# Patient Record
Sex: MALE | Race: OTHER
[De-identification: names, ages, dates, MRNs, and addresses within clinical notes are randomized per-mention and may not be internally consistent; named-entity substitution may affect disease eponyms.]

---

## 2018-10-19 ENCOUNTER — HOSPITAL ENCOUNTER (EMERGENCY)
Dept: HOSPITAL 72 - EMR | Age: 53
LOS: 1 days | Discharge: HOME | End: 2018-10-20
Payer: MEDICAID

## 2018-10-19 VITALS — BODY MASS INDEX: 23.32 KG/M2 | WEIGHT: 140 LBS | HEIGHT: 65 IN

## 2018-10-19 VITALS — DIASTOLIC BLOOD PRESSURE: 70 MMHG | SYSTOLIC BLOOD PRESSURE: 130 MMHG

## 2018-10-19 VITALS — DIASTOLIC BLOOD PRESSURE: 78 MMHG | SYSTOLIC BLOOD PRESSURE: 138 MMHG

## 2018-10-19 DIAGNOSIS — K42.9: ICD-10-CM

## 2018-10-19 DIAGNOSIS — N39.0: ICD-10-CM

## 2018-10-19 DIAGNOSIS — K40.30: Primary | ICD-10-CM

## 2018-10-19 DIAGNOSIS — K57.30: ICD-10-CM

## 2018-10-19 LAB
ADD MANUAL DIFF: NO
ALBUMIN SERPL-MCNC: 4.2 G/DL (ref 3.4–5)
ALBUMIN/GLOB SERPL: 1 {RATIO} (ref 1–2.7)
ALP SERPL-CCNC: 69 U/L (ref 46–116)
ALT SERPL-CCNC: 34 U/L (ref 12–78)
ANION GAP SERPL CALC-SCNC: 12 MMOL/L (ref 5–15)
APPEARANCE UR: CLEAR
APTT BLD: 23 SEC (ref 23–33)
APTT PPP: YELLOW S
AST SERPL-CCNC: 19 U/L (ref 15–37)
BASOPHILS NFR BLD AUTO: 1.6 % (ref 0–2)
BILIRUB SERPL-MCNC: 0.5 MG/DL (ref 0.2–1)
BUN SERPL-MCNC: 15 MG/DL (ref 7–18)
CALCIUM SERPL-MCNC: 9.7 MG/DL (ref 8.5–10.1)
CHLORIDE SERPL-SCNC: 102 MMOL/L (ref 98–107)
CO2 SERPL-SCNC: 26 MMOL/L (ref 21–32)
CREAT SERPL-MCNC: 0.9 MG/DL (ref 0.55–1.3)
EOSINOPHIL NFR BLD AUTO: 1.2 % (ref 0–3)
ERYTHROCYTE [DISTWIDTH] IN BLOOD BY AUTOMATED COUNT: 11.3 % (ref 11.6–14.8)
GLOBULIN SER-MCNC: 4.2 G/DL
GLUCOSE UR STRIP-MCNC: NEGATIVE MG/DL
HCT VFR BLD CALC: 48.1 % (ref 42–52)
HGB BLD-MCNC: 16.6 G/DL (ref 14.2–18)
INR PPP: 1 (ref 0.9–1.1)
KETONES UR QL STRIP: (no result)
LEUKOCYTE ESTERASE UR QL STRIP: (no result)
LYMPHOCYTES NFR BLD AUTO: 14.7 % (ref 20–45)
MCV RBC AUTO: 86 FL (ref 80–99)
MONOCYTES NFR BLD AUTO: 6.4 % (ref 1–10)
NEUTROPHILS NFR BLD AUTO: 76.1 % (ref 45–75)
NITRITE UR QL STRIP: NEGATIVE
PH UR STRIP: 8 [PH] (ref 4.5–8)
PLATELET # BLD: 222 K/UL (ref 150–450)
POTASSIUM SERPL-SCNC: 4.2 MMOL/L (ref 3.5–5.1)
PROT UR QL STRIP: (no result)
RBC # BLD AUTO: 5.61 M/UL (ref 4.7–6.1)
SODIUM SERPL-SCNC: 140 MMOL/L (ref 136–145)
SP GR UR STRIP: 1.01 (ref 1–1.03)
UROBILINOGEN UR-MCNC: NORMAL MG/DL (ref 0–1)
WBC # BLD AUTO: 10.1 K/UL (ref 4.8–10.8)

## 2018-10-19 PROCEDURE — 84484 ASSAY OF TROPONIN QUANT: CPT

## 2018-10-19 PROCEDURE — 74177 CT ABD & PELVIS W/CONTRAST: CPT

## 2018-10-19 PROCEDURE — 36415 COLL VENOUS BLD VENIPUNCTURE: CPT

## 2018-10-19 PROCEDURE — 85730 THROMBOPLASTIN TIME PARTIAL: CPT

## 2018-10-19 PROCEDURE — 86850 RBC ANTIBODY SCREEN: CPT

## 2018-10-19 PROCEDURE — 96365 THER/PROPH/DIAG IV INF INIT: CPT

## 2018-10-19 PROCEDURE — 85610 PROTHROMBIN TIME: CPT

## 2018-10-19 PROCEDURE — 96376 TX/PRO/DX INJ SAME DRUG ADON: CPT

## 2018-10-19 PROCEDURE — 99285 EMERGENCY DEPT VISIT HI MDM: CPT

## 2018-10-19 PROCEDURE — 85025 COMPLETE CBC W/AUTO DIFF WBC: CPT

## 2018-10-19 PROCEDURE — 93005 ELECTROCARDIOGRAM TRACING: CPT

## 2018-10-19 PROCEDURE — 86901 BLOOD TYPING SEROLOGIC RH(D): CPT

## 2018-10-19 PROCEDURE — 83690 ASSAY OF LIPASE: CPT

## 2018-10-19 PROCEDURE — 81003 URINALYSIS AUTO W/O SCOPE: CPT

## 2018-10-19 PROCEDURE — 80053 COMPREHEN METABOLIC PANEL: CPT

## 2018-10-19 PROCEDURE — 86900 BLOOD TYPING SEROLOGIC ABO: CPT

## 2018-10-19 PROCEDURE — 96375 TX/PRO/DX INJ NEW DRUG ADDON: CPT

## 2018-10-19 PROCEDURE — 96361 HYDRATE IV INFUSION ADD-ON: CPT

## 2018-10-19 NOTE — EMERGENCY ROOM REPORT
History of Present Illness


General


Chief Complaint:  Abdominal Pain


Source:  Patient





Present Illness


HPI


Patient presents with severe abdominal pain.  He states it happened suddenly 

earlier today.  He feels it in his left flank radiating down to his groin.  

Also he feels it's epigastric.  He denies ever having pain like this before.  

Severe and he felt nauseated with some vomiting.  Denies any fevers or chills.  

There's no dysuria.  The patient's moving his bowels without any difficulty.  

The pain is 10/10 at this time.





He has had a hernia on the right.  It usually is able to be reduced without 

difficulty.





No URI sy, chest pain, headache, rashes, dysuria, extremity pain.  





Pain causing anxiety.


Allergies:  


Coded Allergies:  


     No Known Allergies (Unverified , 10/19/18)





Patient History


Past Medical History:  see triage record


Social History:  Denies: smoking


Social History Narrative


with friend


Reviewed Nursing Documentation:  PMH: Agreed; PSxH: Agreed





Nursing Documentation-PMH


Past Medical History:  No Stated History





Review of Systems


All Other Systems:  negative except mentioned in HPI





Physical Exam





Vital Signs








  Date Time  Temp Pulse Resp B/P (MAP) Pulse Ox O2 Delivery O2 Flow Rate FiO2


 


10/19/18 20:20 98.2 85 18 119/75 98 Room Air  





 98.2       








Sp02 EP Interpretation:  reviewed, normal


General Appearance:  GCS 15, non-toxic, moderate distress


Head:  normocephalic


Eyes:  bilateral eye normal inspection, bilateral eye PERRL


ENT:  moist mucus membranes


Neck:  supple


Respiratory:  lungs clear, normal breath sounds


Cardiovascular #1:  regular rate, rhythm


Cardiovascular #2:  2+ radial (R)


Gastrointestinal:  no rebound, guarding, tenderness, hernia - bilat, R = large, 

decreased bowel sounds


Genitourinary:  no CVA tenderness


Musculoskeletal:  back normal, gait/station normal, normal range of motion


Neurologic:  oriented x3, grossly normal


Psychiatric:  anxious - with pain


Skin:  normal inspection, warm/dry





Medical Decision Making


Diagnostic Impression:  


 Primary Impression:  


 Right inguinal hernia


 Additional Impression:  


 UTI (urinary tract infection)


 Qualified Codes:  N30.00 - Acute cystitis without hematuria


ER Course


Patient presents with severe abdominal pain.  Differential includes perforated 

peptic ulcer, cholecystitis, renal stone, aortic aneurysm, diverticulitis 

amongst others.  The patient needs to be evaluated with labs and a CT of the 

abdomen.  We will focus on aggressive pain control also.





Patient better with dilaudid.





WBC slightly high.  Min pyuria.





CT with incarcerated hernia.





Attempt to reduce.  Unable.





Antibiotics ordered.





Admit med.  





With pain meds hernia reduced.





Examined by Dr. Díaz who agrees that patient stable for outpatient 

observation and treatment.





Laboratory Tests








Test


  10/19/18


20:53 10/19/18


21:15


 


White Blood Count


  10.1 K/UL


(4.8-10.8) 


 


 


Red Blood Count


  5.61 M/UL


(4.70-6.10) 


 


 


Hemoglobin


  16.6 G/DL


(14.2-18.0) 


 


 


Hematocrit


  48.1 %


(42.0-52.0) 


 


 


Mean Corpuscular Volume 86 FL (80-99)   


 


Mean Corpuscular Hemoglobin


  29.6 PG


(27.0-31.0) 


 


 


Mean Corpuscular Hemoglobin


Concent 34.5 G/DL


(32.0-36.0) 


 


 


Red Cell Distribution Width


  11.3 %


(11.6-14.8)  L 


 


 


Platelet Count


  222 K/UL


(150-450) 


 


 


Mean Platelet Volume


  6.7 FL


(6.5-10.1) 


 


 


Neutrophils (%) (Auto)


  76.1 %


(45.0-75.0)  H 


 


 


Lymphocytes (%) (Auto)


  14.7 %


(20.0-45.0)  L 


 


 


Monocytes (%) (Auto)


  6.4 %


(1.0-10.0) 


 


 


Eosinophils (%) (Auto)


  1.2 %


(0.0-3.0) 


 


 


Basophils (%) (Auto)


  1.6 %


(0.0-2.0) 


 


 


Prothrombin Time


  10.8 SEC


(9.30-11.50) 


 


 


Prothrombin Time INR 1.0 (0.9-1.1)   


 


PTT


  23 SEC (23-33)


  


 


 


Sodium Level


  140 MMOL/L


(136-145) 


 


 


Potassium Level


  4.2 MMOL/L


(3.5-5.1) 


 


 


Chloride Level


  102 MMOL/L


() 


 


 


Carbon Dioxide Level


  26 MMOL/L


(21-32) 


 


 


Anion Gap


  12 mmol/L


(5-15) 


 


 


Blood Urea Nitrogen


  15 mg/dL


(7-18) 


 


 


Creatinine


  0.9 MG/DL


(0.55-1.30) 


 


 


Estimate Glomerular


Filtration Rate > 60 mL/min


(>60) 


 


 


Glucose Level


  120 MG/DL


()  H 


 


 


Calcium Level


  9.7 MG/DL


(8.5-10.1) 


 


 


Total Bilirubin


  0.5 MG/DL


(0.2-1.0) 


 


 


Aspartate Amino Transferase


(AST) 19 U/L (15-37)


  


 


 


Alanine Aminotransferase (ALT)


  34 U/L (12-78)


  


 


 


Alkaline Phosphatase


  69 U/L


() 


 


 


Troponin I


  0.000 ng/mL


(0.000-0.056) 


 


 


Total Protein


  8.4 G/DL


(6.4-8.2)  H 


 


 


Albumin


  4.2 G/DL


(3.4-5.0) 


 


 


Globulin 4.2 g/dL   


 


Albumin/Globulin Ratio 1.0 (1.0-2.7)   


 


Lipase


  135 U/L


() 


 


 


Urine Color  Yellow  


 


Urine Appearance  Clear  


 


Urine pH  8 (4.5-8.0)  


 


Urine Specific Gravity


  


  1.010


(1.005-1.035)


 


Urine Protein


  


  1+ (NEGATIVE)


H


 


Urine Glucose (UA)


  


  Negative


(NEGATIVE)


 


Urine Ketones


  


  3+ (NEGATIVE)


H


 


Urine Blood


  


  1+ (NEGATIVE)


H


 


Urine Nitrite


  


  Negative


(NEGATIVE)


 


Urine Bilirubin


  


  Negative


(NEGATIVE)


 


Urine Urobilinogen


  


  Normal MG/DL


(0.0-1.0)


 


Urine Leukocyte Esterase


  


  1+ (NEGATIVE)


H


 


Urine RBC


  


  2-4 /HPF (0 -


0)  H


 


Urine WBC


  


  5-10 /HPF (0 -


0)  H


 


Urine Squamous Epithelial


Cells 


  None /LPF


(NONE/OCC)


 


Urine Bacteria


  


  Few /HPF


(NONE)








CT/MRI/US Diagnostic Results


CT/MRI/US Diagnostic Results :  


   Imaging Test Ordered:  CT abd pelvic


   Impression


incarcerated inguinal hernia with some bowel obstruction





Last Vital Signs








  Date Time  Temp Pulse Resp B/P (MAP) Pulse Ox O2 Delivery O2 Flow Rate FiO2


 


10/20/18 00:15 97.5 81 18 138/78 98 Room Air  





 97.5       








Status:  improved


Disposition:  HOME, SELF-CARE


Condition:  Improved


Scripts


Nitrofurantoin Monohyd/M-Cryst* (MACROBID 100 MG*) 100 Mg Capsule


100 MG ORAL EVERY 12 HOURS, #14 CAP


   Prov: Ulysses Alford M.D.         10/20/18 


Ibuprofen* (MOTRIN*) 600 Mg Tablet


600 MG ORAL Q6H PRN for For Pain, #16 TAB


   Prov: Ulysses Alford M.D.         10/19/18


Referrals:  


NOT CHOSEN [facility]/MD,REFERRING (PCP)











Ulysses Alford M.D. Oct 19, 2018 21:45

## 2018-10-19 NOTE — DIAGNOSTIC IMAGING REPORT
EXAM:

  CT Abdomen and Pelvis With Intravenous Contrast

 

CLINICAL HISTORY:

  ABD PAIN

 

TECHNIQUE:

  Axial computed tomography images of the abdomen and pelvis with 

intravenous contrast.  CTDI is 12 mGy and DLP is 672 mGy-cm.  One or more 

of the following dose reduction techniques were used: automated exposure 

control, adjustment of the mA and/or kV according to patient size, use of 

iterative reconstruction technique.

 

COMPARISON:

  No relevant prior studies available.

 

FINDINGS:

  Lung bases:  Unremarkable.  No mass.  No consolidation.

 

 ABDOMEN:

  Liver:  Unremarkable.  No mass.

  Gallbladder and bile ducts:  Unremarkable.  No calcified stones.  No 

ductal dilation.

  Pancreas:  Unremarkable.  No mass.  No ductal dilation.

  Spleen:  Unremarkable.  No splenomegaly.

  Adrenals:  Unremarkable.  No mass.

  Kidneys and ureters:  Unremarkable.  No solid mass.  No hydronephrosis.

  Stomach and bowel:  There is a large right inguinal hernia containing 

fat and a loop of fluid-filled prominent small bowel which extends into 

the right scrotum.  Findings are most consistent with an incarcerated 

hernia and closed-loop small bowel obstruction.  The neck of the hernia 

defect is 3.0 cm transversely.  Diverticulosis of the colon.  No acute 

diverticulitis.

 

 PELVIS:

  Appendix:  No findings to suggest acute appendicitis.

  Bladder:  Unremarkable.  No mass.

  Reproductive:  Unremarkable as visualized.

 

 ABDOMEN and PELVIS:

  Intraperitoneal space:  No free air.  No evidence of gangrene.  No 

significant fluid collection.

  Bones/joints:  Degenerative changes of the spine.  No acute fracture.  

No dislocation.

  Soft tissues:  See above.

  Vasculature:  Unremarkable.  No abdominal aortic aneurysm.

  Lymph nodes:  Unremarkable.  No enlarged lymph nodes.

 

IMPRESSION:     

  Large right inguinal hernia containing fat and fluid filled prominent 

loops of small bowel most consistent with an incarcerated hernia and 

closed-loop small bowel obstruction.

 

 

 

[employer]

 

10/19/18 22:48 Verify Receipt Verified receipt with Dr. Preciado on 10/19 

22:48 (-07:00)

 

10/19/18 22:48 Call From [facility] Dr. Alford

## 2018-10-20 VITALS — DIASTOLIC BLOOD PRESSURE: 78 MMHG | SYSTOLIC BLOOD PRESSURE: 138 MMHG

## 2018-10-20 NOTE — CONSULTATION
History of Present Illness


General


Date patient seen:  Oct 20, 2018


Chief Complaint:  Abdominal Pain


Reason for Consultation:  right inguinal hernia incarcerated





Present Illness


HPI


53 year old otherwise healthy male presented to ED with complaints of severe 

abdominal pain.  states generalized abdominal pain with radiation to right groin

/ testicle since earlier today.  pain cramping 10/10 constant.  Multiple 

episodes of non bloody emesis and intermittent nausea.  +flatus.  +BM.  Is 

unsure of how long he has had hernia for but knows about it.  First episode of 

incarceration today.  In ED noted to have significant pain and unable to reduce 

hernia.  CT demonstrated large bowel obtaining hernia.  Surgery called to 

evaluate.  patient seen, chart reviewed, patient examined, CT reviewed.


Allergies:  


Coded Allergies:  


     No Known Allergies (Unverified , 10/19/18)





Medication History


Scheduled


Nitrofurantoin Monohyd/M-Cryst* (Macrobid 100 Mg*), 100 MG ORAL EVERY 12 HOURS





Scheduled PRN


Ibuprofen* (Motrin*), 600 MG ORAL Q6H PRN for For Pain





Patient History


History Provided By:  Patient, Medical Record, PMD


Healthcare decision maker





Resuscitation status





Advanced Directive on File








Past Medical/Surgical History


Past Medical/Surgical History:  


(1) Incarcerated right inguinal hernia


(2) UTI (urinary tract infection)


(3) Right inguinal hernia





Review of Systems


All Other Systems:  negative except mentioned in HPI





Physical Exam


General Appearance:  WD/WN, alert


Lines, tubes and drains:  peripheral


HEENT:  atraumatic, mucous membranes moist


Neck:  normal inspection


Respiratory/Chest:  normal breath sounds, no respiratory distress, no accessory 

muscle use


Cardiovascular/Chest:  normal peripheral pulses, normal rate, regular rhythm


Abdomen:  soft, no organomegaly, no mass, hernia - reduced right inguinal 

hernia.  small umbilical hernia.  small reducible left inguinal hernia


Extremities:  non-tender, normal inspection, no calf tenderness


Skin Exam:  warm/dry


Neurologic:  alert, oriented x 3





Last 24 Hour Vital Signs








  Date Time  Temp Pulse Resp B/P (MAP) Pulse Ox O2 Delivery O2 Flow Rate FiO2


 


10/20/18 00:15 97.5 81 18 138/78 98 Room Air  





 97.5       


 


10/19/18 23:40 97.5       


 


10/19/18 23:33 97.5       


 


10/19/18 23:33 97.5       


 


10/19/18 23:32 97.5       


 


10/19/18 23:10  81 18 130/70 98 Room Air  


 


10/19/18 22:15 97.5       


 


10/19/18 22:15 97.5       


 


10/19/18 21:15 97.5 85 18 138/78 98 Room Air  





 97.5       


 


10/19/18 20:59 98.2       


 


10/19/18 20:50 98.2       


 


10/19/18 20:20 98.2 85 18 119/75 98 Room Air  





 98.2       

















Intake and Output  


 


 10/19/18 10/20/18





 19:00 07:00


 


Intake Total  2050 ml


 


Output Total  200 ml


 


Balance  1850 ml


 


  


 


Intake IV Total  2050 ml


 


Output Urine Total  200 ml











Laboratory Tests








Test


  10/19/18


20:53 10/19/18


21:15


 


White Blood Count


  10.1 K/UL


(4.8-10.8) 


 


 


Red Blood Count


  5.61 M/UL


(4.70-6.10) 


 


 


Hemoglobin


  16.6 G/DL


(14.2-18.0) 


 


 


Hematocrit


  48.1 %


(42.0-52.0) 


 


 


Mean Corpuscular Volume 86 FL (80-99)   


 


Mean Corpuscular Hemoglobin


  29.6 PG


(27.0-31.0) 


 


 


Mean Corpuscular Hemoglobin


Concent 34.5 G/DL


(32.0-36.0) 


 


 


Red Cell Distribution Width


  11.3 %


(11.6-14.8)  L 


 


 


Platelet Count


  222 K/UL


(150-450) 


 


 


Mean Platelet Volume


  6.7 FL


(6.5-10.1) 


 


 


Neutrophils (%) (Auto)


  76.1 %


(45.0-75.0)  H 


 


 


Lymphocytes (%) (Auto)


  14.7 %


(20.0-45.0)  L 


 


 


Monocytes (%) (Auto)


  6.4 %


(1.0-10.0) 


 


 


Eosinophils (%) (Auto)


  1.2 %


(0.0-3.0) 


 


 


Basophils (%) (Auto)


  1.6 %


(0.0-2.0) 


 


 


Prothrombin Time


  10.8 SEC


(9.30-11.50) 


 


 


Prothromb Time International


Ratio 1.0 (0.9-1.1)  


  


 


 


Activated Partial


Thromboplast Time 23 SEC (23-33)


  


 


 


Sodium Level


  140 MMOL/L


(136-145) 


 


 


Potassium Level


  4.2 MMOL/L


(3.5-5.1) 


 


 


Chloride Level


  102 MMOL/L


() 


 


 


Carbon Dioxide Level


  26 MMOL/L


(21-32) 


 


 


Anion Gap


  12 mmol/L


(5-15) 


 


 


Blood Urea Nitrogen


  15 mg/dL


(7-18) 


 


 


Creatinine


  0.9 MG/DL


(0.55-1.30) 


 


 


Estimat Glomerular Filtration


Rate > 60 mL/min


(>60) 


 


 


Glucose Level


  120 MG/DL


()  H 


 


 


Calcium Level


  9.7 MG/DL


(8.5-10.1) 


 


 


Total Bilirubin


  0.5 MG/DL


(0.2-1.0) 


 


 


Aspartate Amino Transf


(AST/SGOT) 19 U/L (15-37)


  


 


 


Alanine Aminotransferase


(ALT/SGPT) 34 U/L (12-78)


  


 


 


Alkaline Phosphatase


  69 U/L


() 


 


 


Troponin I


  0.000 ng/mL


(0.000-0.056) 


 


 


Total Protein


  8.4 G/DL


(6.4-8.2)  H 


 


 


Albumin


  4.2 G/DL


(3.4-5.0) 


 


 


Globulin 4.2 g/dL   


 


Albumin/Globulin Ratio 1.0 (1.0-2.7)   


 


Lipase


  135 U/L


() 


 


 


Urine Color  Yellow  


 


Urine Appearance  Clear  


 


Urine pH  8 (4.5-8.0)  


 


Urine Specific Gravity


  


  1.010


(1.005-1.035)


 


Urine Protein


  


  1+ (NEGATIVE)


H


 


Urine Glucose (UA)


  


  Negative


(NEGATIVE)


 


Urine Ketones


  


  3+ (NEGATIVE)


H


 


Urine Blood


  


  1+ (NEGATIVE)


H


 


Urine Nitrite


  


  Negative


(NEGATIVE)


 


Urine Bilirubin


  


  Negative


(NEGATIVE)


 


Urine Urobilinogen


  


  Normal MG/DL


(0.0-1.0)


 


Urine Leukocyte Esterase


  


  1+ (NEGATIVE)


H


 


Urine RBC


  


  2-4 /HPF (0 -


0)  H


 


Urine WBC


  


  5-10 /HPF (0 -


0)  H


 


Urine Squamous Epithelial


Cells 


  None /LPF


(NONE/OCC)


 


Urine Bacteria


  


  Few /HPF


(NONE)








Height (Feet):  5


Height (Inches):  5.00


Weight (Pounds):  140


Medications





Current Medications








 Medications


  (Trade)  Dose


 Ordered  Sig/Marilyn


 Route


 PRN Reason  Start Time


 Stop Time Status Last Admin


Dose Admin


 


 Iopamidol


  (Isovue-300


 100ml)  100 ml  NOW  PRN


 INJ


 Radiology Procedure  10/19/18 21:00


     


 


 


 Sodium Chloride  1,000 ml @ 


 300 mls/hr  Q3H20M


 IV


   10/19/18 21:30


 11/18/18 21:29  10/19/18 22:15


 











Assessment/Plan


Problem List:  


(1) Incarcerated right inguinal hernia


Assessment & Plan:  Patient given pain medication and fortunately hernia 

reduced spontaneously.  Exam demonstrated complete reduction of hernia 

contents.  Abd soft non distended.  scrotum and groin stable.  labs okay.  





No acute surgical intervention necessary


discussed findings and care plan with patient and friend at bedside in detail


recommend elective hernia repair as an outpatient soon.  advised patient to f/u 

with PCP right away for referral to surgeon


patient expressed understanding and will plan for elective repair


okay to d/c home


trial oral intake. 


thank you


ICD Codes:  K40.30 - Unilateral inguinal hernia, with obstruction, without 

gangrene, not specified as recurrent


SNOMED:  [id number]


Status:  stable











Kenji Díaz Oct 20, 2018 00:31